# Patient Record
Sex: FEMALE | Race: WHITE
[De-identification: names, ages, dates, MRNs, and addresses within clinical notes are randomized per-mention and may not be internally consistent; named-entity substitution may affect disease eponyms.]

---

## 2023-08-17 ENCOUNTER — APPOINTMENT (OUTPATIENT)
Dept: FAMILY MEDICINE | Facility: CLINIC | Age: 53
End: 2023-08-17
Payer: COMMERCIAL

## 2023-08-17 ENCOUNTER — TRANSCRIPTION ENCOUNTER (OUTPATIENT)
Age: 53
End: 2023-08-17

## 2023-08-17 VITALS
HEIGHT: 62 IN | DIASTOLIC BLOOD PRESSURE: 60 MMHG | BODY MASS INDEX: 23.74 KG/M2 | SYSTOLIC BLOOD PRESSURE: 100 MMHG | TEMPERATURE: 97.5 F | OXYGEN SATURATION: 98 % | WEIGHT: 129 LBS | HEART RATE: 77 BPM

## 2023-08-17 DIAGNOSIS — Z13.1 ENCOUNTER FOR SCREENING FOR DIABETES MELLITUS: ICD-10-CM

## 2023-08-17 DIAGNOSIS — Z13.29 ENCOUNTER FOR SCREENING FOR OTHER SUSPECTED ENDOCRINE DISORDER: ICD-10-CM

## 2023-08-17 DIAGNOSIS — Z82.49 FAMILY HISTORY OF ISCHEMIC HEART DISEASE AND OTHER DISEASES OF THE CIRCULATORY SYSTEM: ICD-10-CM

## 2023-08-17 DIAGNOSIS — Z80.0 FAMILY HISTORY OF MALIGNANT NEOPLASM OF DIGESTIVE ORGANS: ICD-10-CM

## 2023-08-17 DIAGNOSIS — Z83.6 FAMILY HISTORY OF OTHER DISEASES OF THE RESPIRATORY SYSTEM: ICD-10-CM

## 2023-08-17 DIAGNOSIS — Z00.00 ENCOUNTER FOR GENERAL ADULT MEDICAL EXAMINATION W/OUT ABNORMAL FINDINGS: ICD-10-CM

## 2023-08-17 DIAGNOSIS — Z13.220 ENCOUNTER FOR SCREENING FOR LIPOID DISORDERS: ICD-10-CM

## 2023-08-17 LAB — NONINV COLON CA DNA+OCC BLD SCRN STL QL: NEGATIVE

## 2023-08-17 PROCEDURE — 99386 PREV VISIT NEW AGE 40-64: CPT | Mod: 25

## 2023-08-17 PROCEDURE — 93000 ELECTROCARDIOGRAM COMPLETE: CPT

## 2023-08-18 ENCOUNTER — APPOINTMENT (OUTPATIENT)
Age: 53
End: 2023-08-18

## 2023-08-18 LAB
25(OH)D3 SERPL-MCNC: 56.6 NG/ML
ALBUMIN SERPL ELPH-MCNC: 4.6 G/DL
ALP BLD-CCNC: 91 U/L
ALT SERPL-CCNC: 22 U/L
ANION GAP SERPL CALC-SCNC: 10 MMOL/L
APPEARANCE: ABNORMAL
AST SERPL-CCNC: 23 U/L
BACTERIA: ABNORMAL /HPF
BILIRUB SERPL-MCNC: 0.3 MG/DL
BILIRUBIN URINE: NEGATIVE
BLOOD URINE: NEGATIVE
BUN SERPL-MCNC: 14 MG/DL
CALCIUM SERPL-MCNC: 10 MG/DL
CAST: 1 /LPF
CHLORIDE SERPL-SCNC: 104 MMOL/L
CHOLEST SERPL-MCNC: 199 MG/DL
CO2 SERPL-SCNC: 28 MMOL/L
COLOR: YELLOW
CREAT SERPL-MCNC: 0.73 MG/DL
EGFR: 99 ML/MIN/1.73M2
EPITHELIAL CELLS: 4 /HPF
ESTIMATED AVERAGE GLUCOSE: 103 MG/DL
GLUCOSE QUALITATIVE U: NEGATIVE MG/DL
GLUCOSE SERPL-MCNC: 112 MG/DL
HBA1C MFR BLD HPLC: 5.2 %
HDLC SERPL-MCNC: 82 MG/DL
HIV1+2 AB SPEC QL IA.RAPID: NONREACTIVE
KETONES URINE: NEGATIVE MG/DL
LDLC SERPL CALC-MCNC: 103 MG/DL
LEUKOCYTE ESTERASE URINE: NEGATIVE
MICROSCOPIC-UA: NORMAL
NITRITE URINE: NEGATIVE
NONHDLC SERPL-MCNC: 116 MG/DL
PH URINE: 7
POTASSIUM SERPL-SCNC: 4.9 MMOL/L
PROT SERPL-MCNC: 7.2 G/DL
PROTEIN URINE: NEGATIVE MG/DL
RED BLOOD CELLS URINE: 1 /HPF
SODIUM SERPL-SCNC: 142 MMOL/L
SPECIFIC GRAVITY URINE: 1.02
T4 FREE SERPL-MCNC: 1.2 NG/DL
TRIGL SERPL-MCNC: 75 MG/DL
TSH SERPL-ACNC: 0.76 UIU/ML
URATE SERPL-MCNC: 3.4 MG/DL
UROBILINOGEN URINE: 0.2 MG/DL
WHITE BLOOD CELLS URINE: 1 /HPF

## 2023-08-18 NOTE — HEALTH RISK ASSESSMENT
[Good] : ~his/her~  mood as  good [2 - 4 times a month (2 pts)] : 2-4 times a month (2 points) [1 or 2 (0 pts)] : 1 or 2 (0 points) [Never (0 pts)] : Never (0 points) [Never] : Never [FreeTextEntry1] : hot flashes [MammogramDate] : 09/22 [PapSmearDate] : 09/22 [PapSmearComments] : [place] [facility]/ Dr. Evelyn Awad [ColonoscopyDate] : 03/23 [ColonoscopyComments] : Cologuard

## 2023-08-18 NOTE — PLAN
[FreeTextEntry1] : - Labs drawn in office today. - Start: Venlafaxine HCI ER 37.5MG; take one capsule every day. - Script provided for: Anti Thyroid Antibodies. - Script provided for: Free T4. - F/u in 3-6 months, sooner if needed.

## 2023-08-18 NOTE — END OF VISIT
[FreeTextEntry3] : Medical record entries made by the scribe today 08/17/2023, were at my direction and personally dictated to them by me, Dr. Lashanda Vargas on 08/17/2023. I have reviewed the chart and agree that the record accurately reflects my personal performance of the history, physical exam, assessment, and plan.

## 2023-08-18 NOTE — HISTORY OF PRESENT ILLNESS
[FreeTextEntry1] : AMANDA YOUNGER is a 53 year old female here for a NPA/CPE. [de-identified] : AMANDA YOUNGER is a 53-year-old female here for a NPA/CPE. Patient's mood is good and overall health seems to be well. Pt reported that she has not fallen within the year. She confirms exercising and practicing healthy eating. She lives with her  and her two children. Her reason for seeking a new PCP is to have a provider that pays more attention to her routine checkups. Pt reported getting a Cologuard done. She is followed by GYN  for routine checkup. Pt stated that her menstrual cycle is irregular, and her last cycle was 6 months ago. Pt is aware that she is due for a mammogram. Pt denies any chest pain or SOB. She reports normal bowel movement and urination. Pt denies being on any medication. She denied smoking. She confirms drinking 2-3 wine glasses per month. Pt denies f/u with dermatologist.

## 2023-08-19 LAB
THYROGLOB AB SERPL-ACNC: <20 IU/ML
THYROPEROXIDASE AB SERPL IA-ACNC: <10 IU/ML

## 2024-06-05 ENCOUNTER — TRANSCRIPTION ENCOUNTER (OUTPATIENT)
Age: 54
End: 2024-06-05

## 2024-06-07 ENCOUNTER — APPOINTMENT (OUTPATIENT)
Dept: FAMILY MEDICINE | Facility: CLINIC | Age: 54
End: 2024-06-07
Payer: COMMERCIAL

## 2024-06-07 VITALS
DIASTOLIC BLOOD PRESSURE: 66 MMHG | BODY MASS INDEX: 23.74 KG/M2 | SYSTOLIC BLOOD PRESSURE: 102 MMHG | HEART RATE: 63 BPM | WEIGHT: 129 LBS | HEIGHT: 62 IN | OXYGEN SATURATION: 97 %

## 2024-06-07 DIAGNOSIS — N95.1 MENOPAUSAL AND FEMALE CLIMACTERIC STATES: ICD-10-CM

## 2024-06-07 DIAGNOSIS — R23.2 FLUSHING: ICD-10-CM

## 2024-06-07 PROCEDURE — 99213 OFFICE O/P EST LOW 20 MIN: CPT

## 2024-06-07 PROCEDURE — G2211 COMPLEX E/M VISIT ADD ON: CPT

## 2024-06-07 RX ORDER — VENLAFAXINE HYDROCHLORIDE 37.5 MG/1
37.5 CAPSULE, EXTENDED RELEASE ORAL
Qty: 30 | Refills: 3 | Status: ACTIVE | COMMUNITY
Start: 2023-08-17 | End: 1900-01-01

## 2024-06-07 NOTE — PLAN
[FreeTextEntry1] : - Renew Venlafaxine 37.5 mg PO so refills are current if needed - Educated on side effects of medication and need to take medication as prescribed for 4 weeks for optimal efficacy. - Encouraged utilizing the portal to communicate with any concerns while on medication in the next few weeks.  - Follow up for annual appointment or sooner if needed.

## 2024-06-07 NOTE — REVIEW OF SYSTEMS
[Fever] : no fever [Chills] : no chills [Fatigue] : fatigue [Hot Flashes] : hot flashes [Night Sweats] : no night sweats [Negative] : Neurological [FreeTextEntry2] : insomnia

## 2024-06-07 NOTE — PHYSICAL EXAM
[No Acute Distress] : no acute distress [Well Nourished] : well nourished [Well Developed] : well developed [Well-Appearing] : well-appearing [Normal Sclera/Conjunctiva] : normal sclera/conjunctiva [PERRL] : pupils equal round and reactive to light [Normal Outer Ear/Nose] : the outer ears and nose were normal in appearance [No Lymphadenopathy] : no lymphadenopathy [No Respiratory Distress] : no respiratory distress  [No Accessory Muscle Use] : no accessory muscle use [Clear to Auscultation] : lungs were clear to auscultation bilaterally [Normal Rate] : normal rate  [Regular Rhythm] : with a regular rhythm [Soft] : abdomen soft [Non Tender] : non-tender [Non-distended] : non-distended [No Joint Swelling] : no joint swelling [Grossly Normal Strength/Tone] : grossly normal strength/tone [Coordination Grossly Intact] : coordination grossly intact [Normal Gait] : normal gait [Normal Affect] : the affect was normal [Normal Insight/Judgement] : insight and judgment were intact

## 2024-06-07 NOTE — HISTORY OF PRESENT ILLNESS
Specimen to lab with order, handed to Inxero Renata   [FreeTextEntry1] : Follow up on medication. [de-identified] : Very pleasant 53 year old female, following up for symptoms of menopause. Patient states she saw Dr. Vargas last August for similar symptoms and was prescribed medication. Patient states at the time, symptoms were not severe and did not take medication as prescribed. Patient states symptoms have increased. Patient endorses good hygiene habits but is unable to stay asleep for more than a few hours and is experiencing hot flashes. Patient states she falls back asleep after an hour or so but then wakes up around 4 am. Patient states is groggy and fatigued in the morning. Patient states she started the medication 2 days ago. Patient endorses she has noticed she feels "foggy" but no other symptoms and would like to continue to see if menopausal symptoms improve.

## 2024-08-30 ENCOUNTER — TRANSCRIPTION ENCOUNTER (OUTPATIENT)
Age: 54
End: 2024-08-30

## 2024-09-03 ENCOUNTER — APPOINTMENT (OUTPATIENT)
Dept: FAMILY MEDICINE | Facility: CLINIC | Age: 54
End: 2024-09-03
Payer: COMMERCIAL

## 2024-09-03 ENCOUNTER — NON-APPOINTMENT (OUTPATIENT)
Age: 54
End: 2024-09-03

## 2024-09-03 VITALS
HEIGHT: 62 IN | BODY MASS INDEX: 23.55 KG/M2 | SYSTOLIC BLOOD PRESSURE: 102 MMHG | HEART RATE: 72 BPM | WEIGHT: 128 LBS | DIASTOLIC BLOOD PRESSURE: 70 MMHG | OXYGEN SATURATION: 98 %

## 2024-09-03 DIAGNOSIS — L65.9 NONSCARRING HAIR LOSS, UNSPECIFIED: ICD-10-CM

## 2024-09-03 DIAGNOSIS — Z00.00 ENCOUNTER FOR GENERAL ADULT MEDICAL EXAMINATION W/OUT ABNORMAL FINDINGS: ICD-10-CM

## 2024-09-03 DIAGNOSIS — R23.2 FLUSHING: ICD-10-CM

## 2024-09-03 DIAGNOSIS — L63.9 ALOPECIA AREATA, UNSPECIFIED: ICD-10-CM

## 2024-09-03 PROCEDURE — 93000 ELECTROCARDIOGRAM COMPLETE: CPT

## 2024-09-03 PROCEDURE — 99396 PREV VISIT EST AGE 40-64: CPT

## 2024-09-03 NOTE — HISTORY OF PRESENT ILLNESS
[FreeTextEntry1] : CPE [de-identified] : 54 year old female here for annual exam stopped getting a period over a year ago still having hot flashes used venlafexine without significant relief of symptoms does admit to some relationship issues with  that is causing increased stress she admits to area of pathcy hair loss

## 2024-09-03 NOTE — PLAN
[FreeTextEntry1] : continue venlaxine consider increase dose to 75 consider HRT to discuss with gyn also discussed veozah

## 2024-09-03 NOTE — HEALTH RISK ASSESSMENT
[Patient reported mammogram was normal] : Patient reported mammogram was normal [Patient reported PAP Smear was normal] : Patient reported PAP Smear was normal [Good] : ~his/her~  mood as  good [None] : None [With Family] : lives with family [] :  [Feels Safe at Home] : Feels safe at home [0] : 2) Feeling down, depressed, or hopeless: Not at all (0) [PHQ-2 Negative - No further assessment needed] : PHQ-2 Negative - No further assessment needed [Never] : Never [Yes] : Yes [2 - 4 times a month (2 pts)] : 2-4 times a month (2 points) [1 or 2 (0 pts)] : 1 or 2 (0 points) [Never (0 pts)] : Never (0 points) [Employed] : employed [# Of Children ___] : has [unfilled] children [Audit-CScore] : 2 [de-identified] : exrecises regularly [[id number]] : 0 [Change in mental status noted] : No change in mental status noted [MammogramDate] : 12/23 [PapSmearDate] : 12/23 [ColonoscopyDate] : 10/23 [ColonoscopyComments] : cologuard negative.

## 2024-09-04 ENCOUNTER — APPOINTMENT (OUTPATIENT)
Age: 54
End: 2024-09-04

## 2024-09-04 LAB
ALBUMIN SERPL ELPH-MCNC: 4.8 G/DL
ALP BLD-CCNC: 99 U/L
ALT SERPL-CCNC: 19 U/L
ANION GAP SERPL CALC-SCNC: 10 MMOL/L
APPEARANCE: CLEAR
AST SERPL-CCNC: 22 U/L
BACTERIA: NEGATIVE /HPF
BASOPHILS # BLD AUTO: 0.01 K/UL
BASOPHILS NFR BLD AUTO: 0.2 %
BILIRUB SERPL-MCNC: 0.7 MG/DL
BILIRUBIN URINE: NEGATIVE
BLOOD URINE: NEGATIVE
BUN SERPL-MCNC: 13 MG/DL
CALCIUM SERPL-MCNC: 9.8 MG/DL
CAST: 0 /LPF
CHLORIDE SERPL-SCNC: 103 MMOL/L
CHOLEST SERPL-MCNC: 214 MG/DL
CO2 SERPL-SCNC: 26 MMOL/L
COLOR: YELLOW
CREAT SERPL-MCNC: 0.7 MG/DL
EGFR: 103 ML/MIN/1.73M2
EOSINOPHIL # BLD AUTO: 0.04 K/UL
EOSINOPHIL NFR BLD AUTO: 0.9 %
EPITHELIAL CELLS: 1 /HPF
ESTIMATED AVERAGE GLUCOSE: 97 MG/DL
GLUCOSE QUALITATIVE U: NEGATIVE MG/DL
GLUCOSE SERPL-MCNC: 97 MG/DL
HBA1C MFR BLD HPLC: 5 %
HCT VFR BLD CALC: 41.4 %
HDLC SERPL-MCNC: 87 MG/DL
HGB BLD-MCNC: 13.4 G/DL
IMM GRANULOCYTES NFR BLD AUTO: 0.2 %
KETONES URINE: NEGATIVE MG/DL
LDLC SERPL CALC-MCNC: 114 MG/DL
LEUKOCYTE ESTERASE URINE: NEGATIVE
LYMPHOCYTES # BLD AUTO: 1 K/UL
LYMPHOCYTES NFR BLD AUTO: 22 %
MAN DIFF?: NORMAL
MCHC RBC-ENTMCNC: 32.3 PG
MCHC RBC-ENTMCNC: 32.4 GM/DL
MCV RBC AUTO: 99.8 FL
MICROSCOPIC-UA: NORMAL
MONOCYTES # BLD AUTO: 0.32 K/UL
MONOCYTES NFR BLD AUTO: 7 %
NEUTROPHILS # BLD AUTO: 3.17 K/UL
NEUTROPHILS NFR BLD AUTO: 69.7 %
NITRITE URINE: NEGATIVE
NONHDLC SERPL-MCNC: 127 MG/DL
PH URINE: 7.5
PLATELET # BLD AUTO: 293 K/UL
POTASSIUM SERPL-SCNC: 5.1 MMOL/L
PROT SERPL-MCNC: 7.2 G/DL
PROTEIN URINE: NEGATIVE MG/DL
RBC # BLD: 4.15 M/UL
RBC # FLD: 12.7 %
RED BLOOD CELLS URINE: 0 /HPF
SODIUM SERPL-SCNC: 139 MMOL/L
SPECIFIC GRAVITY URINE: 1.01
TRIGL SERPL-MCNC: 74 MG/DL
TSH SERPL-ACNC: 1.08 UIU/ML
URATE SERPL-MCNC: 3 MG/DL
UROBILINOGEN URINE: 0.2 MG/DL
WBC # FLD AUTO: 4.55 K/UL
WHITE BLOOD CELLS URINE: 0 /HPF

## 2024-09-05 ENCOUNTER — TRANSCRIPTION ENCOUNTER (OUTPATIENT)
Age: 54
End: 2024-09-05

## 2024-09-06 ENCOUNTER — RX RENEWAL (OUTPATIENT)
Age: 54
End: 2024-09-06

## 2024-09-06 ENCOUNTER — TRANSCRIPTION ENCOUNTER (OUTPATIENT)
Age: 54
End: 2024-09-06

## 2024-10-17 ENCOUNTER — RX RENEWAL (OUTPATIENT)
Age: 54
End: 2024-10-17

## 2025-09-04 ENCOUNTER — APPOINTMENT (OUTPATIENT)
Dept: FAMILY MEDICINE | Facility: CLINIC | Age: 55
End: 2025-09-04

## 2025-09-15 ENCOUNTER — RESULT REVIEW (OUTPATIENT)
Age: 55
End: 2025-09-15

## 2025-09-15 ENCOUNTER — APPOINTMENT (OUTPATIENT)
Dept: ULTRASOUND IMAGING | Facility: CLINIC | Age: 55
End: 2025-09-15
Payer: COMMERCIAL

## 2025-09-15 ENCOUNTER — APPOINTMENT (OUTPATIENT)
Dept: MAMMOGRAPHY | Facility: CLINIC | Age: 55
End: 2025-09-15
Payer: COMMERCIAL

## 2025-09-15 PROCEDURE — 77067 SCR MAMMO BI INCL CAD: CPT | Mod: 26

## 2025-09-15 PROCEDURE — 76641 ULTRASOUND BREAST COMPLETE: CPT | Mod: 26,50

## 2025-09-15 PROCEDURE — 77063 BREAST TOMOSYNTHESIS BI: CPT | Mod: 26
